# Patient Record
Sex: MALE | Race: BLACK OR AFRICAN AMERICAN | NOT HISPANIC OR LATINO | URBAN - METROPOLITAN AREA
[De-identification: names, ages, dates, MRNs, and addresses within clinical notes are randomized per-mention and may not be internally consistent; named-entity substitution may affect disease eponyms.]

---

## 2017-12-05 ENCOUNTER — INPATIENT (INPATIENT)
Facility: HOSPITAL | Age: 32
LOS: 22 days | Discharge: HOME | End: 2017-12-28
Attending: PSYCHIATRY & NEUROLOGY | Admitting: PSYCHIATRY & NEUROLOGY
Payer: COMMERCIAL

## 2017-12-05 ENCOUNTER — EMERGENCY (EMERGENCY)
Facility: HOSPITAL | Age: 32
LOS: 0 days | Discharge: ANOTHER HOSPITAL | End: 2017-12-05
Attending: EMERGENCY MEDICINE
Payer: SELF-PAY

## 2017-12-05 VITALS
RESPIRATION RATE: 16 BRPM | SYSTOLIC BLOOD PRESSURE: 136 MMHG | DIASTOLIC BLOOD PRESSURE: 77 MMHG | HEART RATE: 94 BPM | TEMPERATURE: 99 F | OXYGEN SATURATION: 97 %

## 2017-12-05 VITALS
WEIGHT: 154.98 LBS | SYSTOLIC BLOOD PRESSURE: 125 MMHG | OXYGEN SATURATION: 97 % | HEART RATE: 86 BPM | TEMPERATURE: 96 F | HEIGHT: 69 IN | DIASTOLIC BLOOD PRESSURE: 73 MMHG | RESPIRATION RATE: 19 BRPM

## 2017-12-05 VITALS — WEIGHT: 149.03 LBS | TEMPERATURE: 98 F | HEIGHT: 69 IN

## 2017-12-05 DIAGNOSIS — F33.9 MAJOR DEPRESSIVE DISORDER, RECURRENT, UNSPECIFIED: ICD-10-CM

## 2017-12-05 DIAGNOSIS — F29 UNSPECIFIED PSYCHOSIS NOT DUE TO A SUBSTANCE OR KNOWN PHYSIOLOGICAL CONDITION: ICD-10-CM

## 2017-12-05 DIAGNOSIS — R69 ILLNESS, UNSPECIFIED: ICD-10-CM

## 2017-12-05 LAB
ALBUMIN SERPL ELPH-MCNC: 4.1 G/DL — SIGNIFICANT CHANGE UP (ref 3.3–5)
ALP SERPL-CCNC: 55 U/L — SIGNIFICANT CHANGE UP (ref 40–120)
ALT FLD-CCNC: 30 U/L — SIGNIFICANT CHANGE UP (ref 12–78)
AMPHET UR-MCNC: NEGATIVE — SIGNIFICANT CHANGE UP
ANION GAP SERPL CALC-SCNC: 10 MMOL/L — SIGNIFICANT CHANGE UP (ref 5–17)
APAP SERPL-MCNC: < 2 UG/ML (ref 10–30)
APTT BLD: 32.5 SEC — SIGNIFICANT CHANGE UP (ref 27.5–37.4)
AST SERPL-CCNC: 44 U/L — HIGH (ref 15–37)
BARBITURATES UR SCN-MCNC: NEGATIVE — SIGNIFICANT CHANGE UP
BASOPHILS # BLD AUTO: 0.1 K/UL — SIGNIFICANT CHANGE UP (ref 0–0.2)
BASOPHILS NFR BLD AUTO: 1.2 % — SIGNIFICANT CHANGE UP (ref 0–2)
BENZODIAZ UR-MCNC: NEGATIVE — SIGNIFICANT CHANGE UP
BILIRUB SERPL-MCNC: 0.9 MG/DL — SIGNIFICANT CHANGE UP (ref 0.2–1.2)
BUN SERPL-MCNC: 14 MG/DL — SIGNIFICANT CHANGE UP (ref 7–23)
CALCIUM SERPL-MCNC: 8.8 MG/DL — SIGNIFICANT CHANGE UP (ref 8.5–10.1)
CHLORIDE SERPL-SCNC: 101 MMOL/L — SIGNIFICANT CHANGE UP (ref 96–108)
CO2 SERPL-SCNC: 25 MMOL/L — SIGNIFICANT CHANGE UP (ref 22–31)
COCAINE METAB.OTHER UR-MCNC: NEGATIVE — SIGNIFICANT CHANGE UP
CREAT SERPL-MCNC: 1.27 MG/DL — SIGNIFICANT CHANGE UP (ref 0.5–1.3)
EOSINOPHIL # BLD AUTO: 0 K/UL — SIGNIFICANT CHANGE UP (ref 0–0.5)
EOSINOPHIL NFR BLD AUTO: 0.1 % — SIGNIFICANT CHANGE UP (ref 0–6)
ETHANOL SERPL-MCNC: <10 MG/DL — SIGNIFICANT CHANGE UP (ref 0–10)
GLUCOSE BLDC GLUCOMTR-MCNC: 95 MG/DL — SIGNIFICANT CHANGE UP (ref 70–99)
GLUCOSE SERPL-MCNC: 90 MG/DL — SIGNIFICANT CHANGE UP (ref 70–99)
HCT VFR BLD CALC: 50.5 % — HIGH (ref 39–50)
HGB BLD-MCNC: 17.2 G/DL — HIGH (ref 13–17)
INR BLD: 1.21 RATIO — HIGH (ref 0.88–1.16)
LACTATE SERPL-SCNC: 1 MMOL/L — SIGNIFICANT CHANGE UP (ref 0.7–2)
LYMPHOCYTES # BLD AUTO: 0.9 K/UL — LOW (ref 1–3.3)
LYMPHOCYTES # BLD AUTO: 17.9 % — SIGNIFICANT CHANGE UP (ref 13–44)
MCHC RBC-ENTMCNC: 31.8 PG — SIGNIFICANT CHANGE UP (ref 27–34)
MCHC RBC-ENTMCNC: 34 GM/DL — SIGNIFICANT CHANGE UP (ref 32–36)
MCV RBC AUTO: 93.6 FL — SIGNIFICANT CHANGE UP (ref 80–100)
METHADONE UR-MCNC: NEGATIVE — SIGNIFICANT CHANGE UP
MONOCYTES # BLD AUTO: 0.3 K/UL — SIGNIFICANT CHANGE UP (ref 0–0.9)
MONOCYTES NFR BLD AUTO: 6.8 % — SIGNIFICANT CHANGE UP (ref 2–14)
NEUTROPHILS # BLD AUTO: 3.7 K/UL — SIGNIFICANT CHANGE UP (ref 1.8–7.4)
NEUTROPHILS NFR BLD AUTO: 74 % — SIGNIFICANT CHANGE UP (ref 43–77)
OPIATES UR-MCNC: NEGATIVE — SIGNIFICANT CHANGE UP
PCP SPEC-MCNC: SIGNIFICANT CHANGE UP
PCP UR-MCNC: NEGATIVE — SIGNIFICANT CHANGE UP
PLATELET # BLD AUTO: 164 K/UL — SIGNIFICANT CHANGE UP (ref 150–400)
POTASSIUM SERPL-MCNC: 4.1 MMOL/L — SIGNIFICANT CHANGE UP (ref 3.5–5.3)
POTASSIUM SERPL-SCNC: 4.1 MMOL/L — SIGNIFICANT CHANGE UP (ref 3.5–5.3)
PROT SERPL-MCNC: 8 GM/DL — SIGNIFICANT CHANGE UP (ref 6–8.3)
PROTHROM AB SERPL-ACNC: 13.2 SEC — HIGH (ref 9.8–12.7)
RBC # BLD: 5.4 M/UL — SIGNIFICANT CHANGE UP (ref 4.2–5.8)
RBC # FLD: 10.7 % — LOW (ref 11–15)
SALICYLATES SERPL-MCNC: <1.7 MG/DL — LOW (ref 2.8–20)
SODIUM SERPL-SCNC: 136 MMOL/L — SIGNIFICANT CHANGE UP (ref 135–145)
THC UR QL: NEGATIVE — SIGNIFICANT CHANGE UP
TSH SERPL-MCNC: 0.64 UU/ML — SIGNIFICANT CHANGE UP (ref 0.36–3.74)
WBC # BLD: 4.9 K/UL — SIGNIFICANT CHANGE UP (ref 3.8–10.5)
WBC # FLD AUTO: 4.9 K/UL — SIGNIFICANT CHANGE UP (ref 3.8–10.5)

## 2017-12-05 PROCEDURE — 99285 EMERGENCY DEPT VISIT HI MDM: CPT | Mod: 25

## 2017-12-05 PROCEDURE — 90792 PSYCH DIAG EVAL W/MED SRVCS: CPT

## 2017-12-05 PROCEDURE — 99222 1ST HOSP IP/OBS MODERATE 55: CPT

## 2017-12-05 PROCEDURE — 99053 MED SERV 10PM-8AM 24 HR FAC: CPT

## 2017-12-05 PROCEDURE — 70450 CT HEAD/BRAIN W/O DYE: CPT | Mod: 26

## 2017-12-05 RX ORDER — RISPERIDONE 4 MG/1
0.5 TABLET ORAL
Qty: 0 | Refills: 0 | Status: DISCONTINUED | OUTPATIENT
Start: 2017-12-05 | End: 2017-12-06

## 2017-12-05 RX ORDER — CLONAZEPAM 1 MG
0.5 TABLET ORAL
Qty: 0 | Refills: 0 | Status: DISCONTINUED | OUTPATIENT
Start: 2017-12-05 | End: 2017-12-06

## 2017-12-05 NOTE — ED BEHAVIORAL HEALTH ASSESSMENT NOTE - PAST PSYCHOTROPIC MEDICATION
Klonopin 0.5mg q daily, Ambien 5mgq hs, Propranolol, Remeron.   Patient would not take Sertraline, Ambien, Propranolol or Remeron. Psychiatrist is unsure if he has been compliant with Klonopin or other above medications. States that he struggled with concentration, and so was started on Adderall 5mg bid po on 11/1/2017- unsure about compliance

## 2017-12-05 NOTE — ED BEHAVIORAL HEALTH ASSESSMENT NOTE - HPI (INCLUDE ILLNESS QUALITY, SEVERITY, DURATION, TIMING, CONTEXT, MODIFYING FACTORS, ASSOCIATED SIGNS AND SYMPTOMS)
Briefly, the patient is a 32 year old male, single, quit his job one week ago, moved from [place] 9 months ago, now lives with mother, has no pertinent medical issues, has a chronic psychiatric history notable for chronic suspiciousness, anxiety- R/O Schizotypal personality disorder, no history of inpatient psychiatric admissions, no history of legal issues, no history of substance abuse as per mother, no history of SI or HI, presented to the ed this morning accompanied by ems after he was found wandering the streets, dressed inappropriately for the weather. In the ED he has been essentially calm. Labs and head ct has been wnl.   Met with the patient. He is noted to be guarded, paranoid, and suspicious. He believes that there are cameras in the room, and 'everyone will know everything'. Paranoia seems to be exclusively regarding my questions regarding substance abuse. Other questions he answers even though reluctantly. He endorses worsening symptoms of depression- hopeless, ' I feel like I am dead', worthless, poor appetite, isolative behaviors, and no sleep in the last few days as per patient. He is tearful during the interview. He states that the reason why he was on the streets was because ' the noises did not let me sleep, and they kept bothering me'. He denies any current ah. No VH. He is vague when asked about SI, and states- ' No, but I don't know how I can live like this. Help me'.   Called the patient's psychiatric provider, who states that she has only seen the patient x 3 for evaluation, and seemed to be stable in terms of his mood. He had denied any AH to her, and had denied any substance abuse. She states that she had initially and during subsequent  2 visits started him on Klonopin 0.5mg q daily, Ambien 5mgq hs, Propranolol, Remeron. Patient would not take Sertraline, Ambien, Propranolol or Remeron. She is unsure if he has been compliant with Klonopin. States that he struggled with concentration, and so was started on Adderall 5mg bid po on 11/1/2017.   Called the patient's mother who states that she was concerned because the patient left the house without a phone, and without telling her. She states that he has chronically been suspicious and generally an anxious person, but was able to finish his bachelors, and only recently- 1 week ago quit his job. He moved from [place] 9 months ago, and now lives with his mother. She states that in the last2-4 weeks he has been increasingly anxious, depressed, tearful, and now with worsening paranoia- believing that someone was after him'. She states that at one point last week, he ran out on the streets because he was suspecting that someone was watching him. He has not been sleeping. Mother is concerned for his safety.

## 2017-12-05 NOTE — ED ADULT NURSE REASSESSMENT NOTE - NS ED NURSE REASSESS COMMENT FT1
assumed care of pt in bed 11. Pt is alert and oriented x4. pt states he came to the hospital today by ambulance because he was hungry. pt denies any pain at this time. Pt in bed with two side rails up and on monitor.

## 2017-12-05 NOTE — ED BEHAVIORAL HEALTH ASSESSMENT NOTE - OTHER PAST PSYCHIATRIC HISTORY (INCLUDE DETAILS REGARDING ONSET, COURSE OF ILLNESS, INPATIENT/OUTPATIENT TREATMENT)
has a chronic psychiatric history notable for chronic suspiciousness, anxiety- R/O Schizotypal personality disorder, possible 2 inpatient psychiatric admissions in [place], no history of legal issues, no history of substance abuse as per mother, no history of SI or HI. Patient has been in treatment with Andreea MONDRAGON since 11/1/2017.

## 2017-12-05 NOTE — ED BEHAVIORAL HEALTH ASSESSMENT NOTE - DESCRIPTION
see hpi the patient is a 32 year old male, single, quit his job one week ago, moved from [place] 9 months ago, now lives with mother Calm

## 2017-12-05 NOTE — ED BEHAVIORAL HEALTH ASSESSMENT NOTE - OTHER
chronic mental illness?, ineffective medication management outpatient psychiatric provider did not assess

## 2017-12-05 NOTE — ED PROVIDER NOTE - PROGRESS NOTE DETAILS
Pt's Mom Maura [phone] is called and left message. pt's reported another # [phone] of his mom is called and left message. Dr. Mosley psychiatrist is here eval pt now. Dr. Mosley psychiatrist [facility] pt will be admitted to psych for schizophrenia. Dr. Santana accepts pt to [place] [id number]

## 2017-12-05 NOTE — ED BEHAVIORAL HEALTH ASSESSMENT NOTE - DIFFERENTIAL
At this point suspecting psychotic disorder/Schizophrenia vs MDD with psychosis vs substance induced psychosis (adderall? other drugs?, Schizotypal personality

## 2017-12-05 NOTE — ED PROVIDER NOTE - CONSTITUTIONAL, MLM
normal... Well appearing, well nourished, awake, alert, oriented to person, place, time/situation and in no apparent distress. Speaking in clear full sentences no nasal flaring no shoulders retractions, appears very comfortable lying on this back in a stretcher

## 2017-12-05 NOTE — ED BEHAVIORAL HEALTH ASSESSMENT NOTE - SUMMARY
Briefly, the patient is a 32 year old male, single, quit his job one week ago, moved from [place] 9 months ago, now lives with mother, has no pertinent medical issues, has a chronic psychiatric history notable for chronic suspiciousness, anxiety- R/O Schizotypal personality disorder, no history of inpatient psychiatric admissions, no history of legal issues, no history of substance abuse as per mother, no history of SI or HI, presented to the ed this morning accompanied by ems after he was found wandering the streets, dressed inappropriately for the weather. In the ED he has been essentially calm. Labs and head ct has been wnl.  Based on assessment done, and extensive collateral information obtained from mother+ psychiatric provider, patient has a chronic history of suspiciousness and anxiety, and in the last one month or so, has been exhibiting worsening symptoms of depression, increased anxiety, increased distress, isolative, with poor sleep, and worsening paranoia. He is notably paranoid, suspicious and guarded during my interview and vague when asked about SI. Mother expresses concerns for his safety kalli considering his worsening impulsivity, and worsening symptoms+ distress.   Discussed an inpatient psychiatric admission, which patient agreed, but he would not sign voluntary paperwork. 2pc signed instead. Patient does meet criteria for an inpatient psychiatric admission for symptom stabilization, diagnosis clarification, and ensuring safety. Mother aware. Psychiatric provider aware- and states that upon discharge from inpatient setting she will not take patient back under her care.  At this point suspecting psychotic disorder/Schizophrenia vs MDD with psychosis vs substance induced psychosis (adderall? other drugs?

## 2017-12-05 NOTE — ED ADULT TRIAGE NOTE - CHIEF COMPLAINT QUOTE
Patient BIBA: Shivering: patient wandering streets, not dressed for weather. Patient reports: "I was walking because I couldn't sleep. The voices kept me up." Patient reports "I ran out of my Klonopin." Patient denies suicidal or homicidal ideations. Patient states "I walk to quiet the voices so I can sleep."

## 2017-12-05 NOTE — ED PROVIDER NOTE - OBJECTIVE STATEMENT
32 years old by here found wondering in the street early this morning. Pt is alert and oriented x 3 sts he could not sleep and the hearing voices which kept him awake. pt sts he has a hx of psych seen a psychiatrist Dr. Pacheco in [place] last seen was-  one week ago. Pt also sts he ran out of Klonopin. Pt denies headache, dizziness, trauma, neck/back pain, blurred visions, light sensitivities, focal/distal weakness or numbness, cough, sob, chest pain, nausea, vomiting, fever, chills, abd pain, dysuria, harmful thoughts to himself or others. Pt sts he lives with mom Maura - [phone]. Pt sts he has hx of anxiety and hearing voices for one year.

## 2017-12-06 DIAGNOSIS — F29 UNSPECIFIED PSYCHOSIS NOT DUE TO A SUBSTANCE OR KNOWN PHYSIOLOGICAL CONDITION: ICD-10-CM

## 2017-12-06 DIAGNOSIS — Z79.899 OTHER LONG TERM (CURRENT) DRUG THERAPY: ICD-10-CM

## 2017-12-06 DIAGNOSIS — F20.9 SCHIZOPHRENIA, UNSPECIFIED: ICD-10-CM

## 2017-12-06 DIAGNOSIS — G47.00 INSOMNIA, UNSPECIFIED: ICD-10-CM

## 2017-12-06 DIAGNOSIS — R44.3 HALLUCINATIONS, UNSPECIFIED: ICD-10-CM

## 2017-12-06 DIAGNOSIS — Z73.9 PROBLEM RELATED TO LIFE MANAGEMENT DIFFICULTY, UNSPECIFIED: ICD-10-CM

## 2017-12-06 DIAGNOSIS — F41.9 ANXIETY DISORDER, UNSPECIFIED: ICD-10-CM

## 2017-12-06 PROCEDURE — 99233 SBSQ HOSP IP/OBS HIGH 50: CPT | Mod: 25

## 2017-12-06 PROCEDURE — 90853 GROUP PSYCHOTHERAPY: CPT

## 2017-12-06 RX ORDER — ARIPIPRAZOLE 15 MG/1
2 TABLET ORAL ONCE
Qty: 0 | Refills: 0 | Status: COMPLETED | OUTPATIENT
Start: 2017-12-06 | End: 2017-12-06

## 2017-12-06 RX ORDER — GABAPENTIN 400 MG/1
300 CAPSULE ORAL AT BEDTIME
Qty: 0 | Refills: 0 | Status: DISCONTINUED | OUTPATIENT
Start: 2017-12-06 | End: 2017-12-13

## 2017-12-06 RX ORDER — CLONAZEPAM 1 MG
0.5 TABLET ORAL DAILY
Qty: 0 | Refills: 0 | Status: DISCONTINUED | OUTPATIENT
Start: 2017-12-06 | End: 2017-12-13

## 2017-12-06 RX ORDER — ARIPIPRAZOLE 15 MG/1
5 TABLET ORAL DAILY
Qty: 0 | Refills: 0 | Status: DISCONTINUED | OUTPATIENT
Start: 2017-12-07 | End: 2017-12-08

## 2017-12-06 RX ADMIN — ARIPIPRAZOLE 2 MILLIGRAM(S): 15 TABLET ORAL at 20:56

## 2017-12-07 PROCEDURE — 90853 GROUP PSYCHOTHERAPY: CPT

## 2017-12-07 PROCEDURE — 99232 SBSQ HOSP IP/OBS MODERATE 35: CPT | Mod: 25

## 2017-12-07 RX ORDER — CLONAZEPAM 1 MG
0.5 TABLET ORAL
Qty: 0 | Refills: 0 | Status: DISCONTINUED | OUTPATIENT
Start: 2017-12-07 | End: 2017-12-09

## 2017-12-07 RX ADMIN — ARIPIPRAZOLE 5 MILLIGRAM(S): 15 TABLET ORAL at 14:43

## 2017-12-08 PROCEDURE — 99232 SBSQ HOSP IP/OBS MODERATE 35: CPT

## 2017-12-08 RX ORDER — ARIPIPRAZOLE 15 MG/1
10 TABLET ORAL AT BEDTIME
Qty: 0 | Refills: 0 | Status: DISCONTINUED | OUTPATIENT
Start: 2017-12-09 | End: 2017-12-09

## 2017-12-08 RX ORDER — ARIPIPRAZOLE 15 MG/1
5 TABLET ORAL AT BEDTIME
Qty: 0 | Refills: 0 | Status: COMPLETED | OUTPATIENT
Start: 2017-12-08 | End: 2017-12-08

## 2017-12-08 RX ADMIN — Medication 0.5 MILLIGRAM(S): at 21:04

## 2017-12-08 RX ADMIN — ARIPIPRAZOLE 5 MILLIGRAM(S): 15 TABLET ORAL at 21:04

## 2017-12-08 RX ADMIN — Medication 0.5 MILLIGRAM(S): at 09:58

## 2017-12-09 LAB
ALBUMIN SERPL ELPH-MCNC: 4.5 G/DL — SIGNIFICANT CHANGE UP (ref 3.3–5)
ALP SERPL-CCNC: 47 U/L — SIGNIFICANT CHANGE UP (ref 40–120)
ALT FLD-CCNC: 16 U/L — SIGNIFICANT CHANGE UP (ref 4–41)
AST SERPL-CCNC: 22 U/L — SIGNIFICANT CHANGE UP (ref 4–40)
BILIRUB DIRECT SERPL-MCNC: 0.1 MG/DL — SIGNIFICANT CHANGE UP (ref 0.1–0.2)
BILIRUB SERPL-MCNC: 0.6 MG/DL — SIGNIFICANT CHANGE UP (ref 0.2–1.2)
CHOLEST SERPL-MCNC: 173 MG/DL — SIGNIFICANT CHANGE UP (ref 120–199)
HBA1C BLD-MCNC: 5.5 % — SIGNIFICANT CHANGE UP (ref 4–5.6)
HDLC SERPL-MCNC: 69 MG/DL — HIGH (ref 35–55)
LIPID PNL WITH DIRECT LDL SERPL: 107 MG/DL — SIGNIFICANT CHANGE UP
PROT SERPL-MCNC: 7.6 G/DL — SIGNIFICANT CHANGE UP (ref 6–8.3)
TRIGL SERPL-MCNC: 59 MG/DL — SIGNIFICANT CHANGE UP (ref 10–149)

## 2017-12-09 PROCEDURE — 99232 SBSQ HOSP IP/OBS MODERATE 35: CPT

## 2017-12-09 RX ORDER — CLONAZEPAM 1 MG
0.5 TABLET ORAL AT BEDTIME
Qty: 0 | Refills: 0 | Status: DISCONTINUED | OUTPATIENT
Start: 2017-12-09 | End: 2017-12-12

## 2017-12-09 RX ORDER — CLONAZEPAM 1 MG
0.25 TABLET ORAL DAILY
Qty: 0 | Refills: 0 | Status: DISCONTINUED | OUTPATIENT
Start: 2017-12-09 | End: 2017-12-16

## 2017-12-09 RX ORDER — ARIPIPRAZOLE 15 MG/1
5 TABLET ORAL AT BEDTIME
Qty: 0 | Refills: 0 | Status: DISCONTINUED | OUTPATIENT
Start: 2017-12-09 | End: 2017-12-10

## 2017-12-09 RX ADMIN — ARIPIPRAZOLE 5 MILLIGRAM(S): 15 TABLET ORAL at 21:52

## 2017-12-09 RX ADMIN — Medication 0.5 MILLIGRAM(S): at 09:58

## 2017-12-09 RX ADMIN — Medication 0.5 MILLIGRAM(S): at 17:44

## 2017-12-10 PROCEDURE — 99232 SBSQ HOSP IP/OBS MODERATE 35: CPT

## 2017-12-10 RX ORDER — ARIPIPRAZOLE 15 MG/1
7 TABLET ORAL AT BEDTIME
Qty: 0 | Refills: 0 | Status: DISCONTINUED | OUTPATIENT
Start: 2017-12-10 | End: 2017-12-14

## 2017-12-10 RX ADMIN — ARIPIPRAZOLE 7 MILLIGRAM(S): 15 TABLET ORAL at 20:32

## 2017-12-10 RX ADMIN — Medication 0.5 MILLIGRAM(S): at 09:19

## 2017-12-10 RX ADMIN — Medication 0.5 MILLIGRAM(S): at 20:32

## 2017-12-11 PROCEDURE — 99232 SBSQ HOSP IP/OBS MODERATE 35: CPT | Mod: 25

## 2017-12-11 PROCEDURE — 90853 GROUP PSYCHOTHERAPY: CPT

## 2017-12-11 RX ADMIN — Medication 0.25 MILLIGRAM(S): at 08:55

## 2017-12-11 RX ADMIN — Medication 0.5 MILLIGRAM(S): at 15:52

## 2017-12-11 RX ADMIN — ARIPIPRAZOLE 7 MILLIGRAM(S): 15 TABLET ORAL at 21:56

## 2017-12-12 PROCEDURE — 90853 GROUP PSYCHOTHERAPY: CPT

## 2017-12-12 PROCEDURE — 99232 SBSQ HOSP IP/OBS MODERATE 35: CPT | Mod: 25

## 2017-12-12 RX ORDER — CLONAZEPAM 1 MG
0.5 TABLET ORAL DAILY
Qty: 0 | Refills: 0 | Status: DISCONTINUED | OUTPATIENT
Start: 2017-12-14 | End: 2017-12-17

## 2017-12-12 RX ORDER — CLONAZEPAM 1 MG
0.25 TABLET ORAL AT BEDTIME
Qty: 0 | Refills: 0 | Status: DISCONTINUED | OUTPATIENT
Start: 2017-12-12 | End: 2017-12-19

## 2017-12-12 RX ADMIN — Medication 0.25 MILLIGRAM(S): at 09:55

## 2017-12-12 RX ADMIN — Medication 0.5 MILLIGRAM(S): at 18:17

## 2017-12-12 RX ADMIN — ARIPIPRAZOLE 7 MILLIGRAM(S): 15 TABLET ORAL at 20:44

## 2017-12-13 PROCEDURE — 99232 SBSQ HOSP IP/OBS MODERATE 35: CPT

## 2017-12-13 RX ORDER — ACETAMINOPHEN 500 MG
650 TABLET ORAL EVERY 6 HOURS
Qty: 0 | Refills: 0 | Status: DISCONTINUED | OUTPATIENT
Start: 2017-12-13 | End: 2017-12-28

## 2017-12-13 RX ADMIN — Medication 0.25 MILLIGRAM(S): at 21:19

## 2017-12-13 RX ADMIN — Medication 0.5 MILLIGRAM(S): at 18:47

## 2017-12-13 RX ADMIN — Medication 650 MILLIGRAM(S): at 22:50

## 2017-12-13 RX ADMIN — Medication 0.25 MILLIGRAM(S): at 08:48

## 2017-12-13 RX ADMIN — ARIPIPRAZOLE 7 MILLIGRAM(S): 15 TABLET ORAL at 21:19

## 2017-12-14 PROCEDURE — 99232 SBSQ HOSP IP/OBS MODERATE 35: CPT

## 2017-12-14 RX ORDER — RISPERIDONE 4 MG/1
2 TABLET ORAL EVERY 4 HOURS
Qty: 0 | Refills: 0 | Status: DISCONTINUED | OUTPATIENT
Start: 2017-12-14 | End: 2017-12-28

## 2017-12-14 RX ORDER — DIPHENHYDRAMINE HCL 50 MG
50 CAPSULE ORAL ONCE
Qty: 0 | Refills: 0 | Status: DISCONTINUED | OUTPATIENT
Start: 2017-12-14 | End: 2017-12-28

## 2017-12-14 RX ORDER — HALOPERIDOL DECANOATE 100 MG/ML
5 INJECTION INTRAMUSCULAR EVERY 6 HOURS
Qty: 0 | Refills: 0 | Status: DISCONTINUED | OUTPATIENT
Start: 2017-12-14 | End: 2017-12-28

## 2017-12-14 RX ORDER — CLONAZEPAM 1 MG
0.25 TABLET ORAL DAILY
Qty: 0 | Refills: 0 | Status: DISCONTINUED | OUTPATIENT
Start: 2017-12-17 | End: 2017-12-18

## 2017-12-14 RX ORDER — ARIPIPRAZOLE 15 MG/1
10 TABLET ORAL AT BEDTIME
Qty: 0 | Refills: 0 | Status: DISCONTINUED | OUTPATIENT
Start: 2017-12-14 | End: 2017-12-19

## 2017-12-14 RX ADMIN — Medication 0.25 MILLIGRAM(S): at 21:54

## 2017-12-14 RX ADMIN — Medication 0.5 MILLIGRAM(S): at 15:31

## 2017-12-14 RX ADMIN — Medication 0.25 MILLIGRAM(S): at 08:30

## 2017-12-14 RX ADMIN — ARIPIPRAZOLE 10 MILLIGRAM(S): 15 TABLET ORAL at 21:54

## 2017-12-15 PROCEDURE — 99232 SBSQ HOSP IP/OBS MODERATE 35: CPT | Mod: 25

## 2017-12-15 PROCEDURE — 90853 GROUP PSYCHOTHERAPY: CPT

## 2017-12-15 RX ORDER — IBUPROFEN 200 MG
600 TABLET ORAL ONCE
Qty: 0 | Refills: 0 | Status: DISCONTINUED | OUTPATIENT
Start: 2017-12-15 | End: 2017-12-28

## 2017-12-15 RX ADMIN — ARIPIPRAZOLE 10 MILLIGRAM(S): 15 TABLET ORAL at 20:45

## 2017-12-15 RX ADMIN — Medication 650 MILLIGRAM(S): at 13:12

## 2017-12-15 RX ADMIN — Medication 0.25 MILLIGRAM(S): at 23:20

## 2017-12-15 RX ADMIN — Medication 0.5 MILLIGRAM(S): at 20:45

## 2017-12-15 RX ADMIN — Medication 0.25 MILLIGRAM(S): at 09:24

## 2017-12-15 RX ADMIN — Medication 650 MILLIGRAM(S): at 14:18

## 2017-12-16 RX ADMIN — Medication 650 MILLIGRAM(S): at 23:41

## 2017-12-16 RX ADMIN — Medication 650 MILLIGRAM(S): at 22:40

## 2017-12-16 RX ADMIN — Medication 0.25 MILLIGRAM(S): at 09:16

## 2017-12-16 RX ADMIN — ARIPIPRAZOLE 10 MILLIGRAM(S): 15 TABLET ORAL at 20:51

## 2017-12-16 RX ADMIN — Medication 0.25 MILLIGRAM(S): at 20:51

## 2017-12-17 RX ADMIN — ARIPIPRAZOLE 10 MILLIGRAM(S): 15 TABLET ORAL at 20:25

## 2017-12-17 RX ADMIN — Medication 0.5 MILLIGRAM(S): at 16:14

## 2017-12-17 RX ADMIN — Medication 0.25 MILLIGRAM(S): at 10:06

## 2017-12-18 PROCEDURE — 90853 GROUP PSYCHOTHERAPY: CPT

## 2017-12-18 PROCEDURE — 99232 SBSQ HOSP IP/OBS MODERATE 35: CPT | Mod: 25

## 2017-12-18 RX ORDER — CLONAZEPAM 1 MG
0.25 TABLET ORAL
Qty: 0 | Refills: 0 | Status: DISCONTINUED | OUTPATIENT
Start: 2017-12-18 | End: 2017-12-25

## 2017-12-18 RX ORDER — LANOLIN ALCOHOL/MO/W.PET/CERES
3 CREAM (GRAM) TOPICAL AT BEDTIME
Qty: 0 | Refills: 0 | Status: DISCONTINUED | OUTPATIENT
Start: 2017-12-18 | End: 2017-12-28

## 2017-12-18 RX ADMIN — Medication 0.25 MILLIGRAM(S): at 17:20

## 2017-12-18 RX ADMIN — Medication 0.25 MILLIGRAM(S): at 20:45

## 2017-12-18 RX ADMIN — ARIPIPRAZOLE 10 MILLIGRAM(S): 15 TABLET ORAL at 20:45

## 2017-12-18 RX ADMIN — Medication 0.25 MILLIGRAM(S): at 08:55

## 2017-12-19 PROCEDURE — 99232 SBSQ HOSP IP/OBS MODERATE 35: CPT | Mod: 25

## 2017-12-19 PROCEDURE — 90853 GROUP PSYCHOTHERAPY: CPT

## 2017-12-19 RX ORDER — CLONAZEPAM 1 MG
0.5 TABLET ORAL DAILY
Qty: 0 | Refills: 0 | Status: DISCONTINUED | OUTPATIENT
Start: 2017-12-22 | End: 2017-12-28

## 2017-12-19 RX ORDER — ARIPIPRAZOLE 15 MG/1
15 TABLET ORAL AT BEDTIME
Qty: 0 | Refills: 0 | Status: DISCONTINUED | OUTPATIENT
Start: 2017-12-19 | End: 2017-12-21

## 2017-12-19 RX ADMIN — Medication 0.25 MILLIGRAM(S): at 21:02

## 2017-12-19 RX ADMIN — Medication 0.25 MILLIGRAM(S): at 18:03

## 2017-12-19 RX ADMIN — ARIPIPRAZOLE 15 MILLIGRAM(S): 15 TABLET ORAL at 21:02

## 2017-12-20 PROCEDURE — 90853 GROUP PSYCHOTHERAPY: CPT

## 2017-12-20 PROCEDURE — 99232 SBSQ HOSP IP/OBS MODERATE 35: CPT | Mod: 25

## 2017-12-20 RX ADMIN — ARIPIPRAZOLE 15 MILLIGRAM(S): 15 TABLET ORAL at 21:23

## 2017-12-20 RX ADMIN — Medication 650 MILLIGRAM(S): at 23:27

## 2017-12-20 RX ADMIN — Medication 0.25 MILLIGRAM(S): at 16:36

## 2017-12-21 PROCEDURE — 90853 GROUP PSYCHOTHERAPY: CPT

## 2017-12-21 PROCEDURE — 99232 SBSQ HOSP IP/OBS MODERATE 35: CPT | Mod: 25

## 2017-12-21 RX ORDER — LANOLIN ALCOHOL/MO/W.PET/CERES
1 CREAM (GRAM) TOPICAL ONCE
Qty: 0 | Refills: 0 | Status: DISCONTINUED | OUTPATIENT
Start: 2017-12-21 | End: 2017-12-21

## 2017-12-21 RX ORDER — LANOLIN ALCOHOL/MO/W.PET/CERES
1.5 CREAM (GRAM) TOPICAL ONCE
Qty: 0 | Refills: 0 | Status: COMPLETED | OUTPATIENT
Start: 2017-12-21 | End: 2017-12-21

## 2017-12-21 RX ORDER — ARIPIPRAZOLE 15 MG/1
20 TABLET ORAL AT BEDTIME
Qty: 0 | Refills: 0 | Status: DISCONTINUED | OUTPATIENT
Start: 2017-12-21 | End: 2017-12-28

## 2017-12-21 RX ADMIN — Medication 1.5 MILLIGRAM(S): at 21:52

## 2017-12-21 RX ADMIN — Medication 0.25 MILLIGRAM(S): at 18:13

## 2017-12-21 RX ADMIN — ARIPIPRAZOLE 20 MILLIGRAM(S): 15 TABLET ORAL at 21:02

## 2017-12-21 RX ADMIN — Medication 3 MILLIGRAM(S): at 01:52

## 2017-12-22 PROCEDURE — 99232 SBSQ HOSP IP/OBS MODERATE 35: CPT

## 2017-12-22 PROCEDURE — 90853 GROUP PSYCHOTHERAPY: CPT

## 2017-12-22 RX ORDER — CLONAZEPAM 1 MG
0.25 TABLET ORAL
Qty: 0 | Refills: 0 | Status: DISCONTINUED | OUTPATIENT
Start: 2017-12-26 | End: 2017-12-26

## 2017-12-22 RX ADMIN — Medication 0.5 MILLIGRAM(S): at 15:40

## 2017-12-22 RX ADMIN — Medication 3 MILLIGRAM(S): at 22:27

## 2017-12-22 RX ADMIN — ARIPIPRAZOLE 20 MILLIGRAM(S): 15 TABLET ORAL at 21:30

## 2017-12-23 RX ADMIN — Medication 0.25 MILLIGRAM(S): at 17:28

## 2017-12-23 RX ADMIN — ARIPIPRAZOLE 20 MILLIGRAM(S): 15 TABLET ORAL at 20:38

## 2017-12-24 RX ADMIN — ARIPIPRAZOLE 20 MILLIGRAM(S): 15 TABLET ORAL at 21:14

## 2017-12-24 RX ADMIN — Medication 1 TABLET(S): at 11:24

## 2017-12-24 RX ADMIN — Medication 0.25 MILLIGRAM(S): at 17:51

## 2017-12-25 RX ADMIN — Medication 3 MILLIGRAM(S): at 23:10

## 2017-12-25 RX ADMIN — ARIPIPRAZOLE 20 MILLIGRAM(S): 15 TABLET ORAL at 20:49

## 2017-12-25 RX ADMIN — Medication 1 TABLET(S): at 09:59

## 2017-12-25 RX ADMIN — Medication 0.25 MILLIGRAM(S): at 17:00

## 2017-12-26 PROCEDURE — 99232 SBSQ HOSP IP/OBS MODERATE 35: CPT

## 2017-12-26 RX ORDER — CLONAZEPAM 1 MG
0 TABLET ORAL
Qty: 0 | Refills: 0 | COMMUNITY

## 2017-12-26 RX ORDER — ARIPIPRAZOLE 15 MG/1
1 TABLET ORAL
Qty: 14 | Refills: 0 | OUTPATIENT
Start: 2017-12-26 | End: 2018-01-08

## 2017-12-26 RX ORDER — CLONAZEPAM 1 MG
1 TABLET ORAL
Qty: 14 | Refills: 0 | OUTPATIENT
Start: 2017-12-26 | End: 2018-01-08

## 2017-12-26 RX ORDER — LANOLIN ALCOHOL/MO/W.PET/CERES
1 CREAM (GRAM) TOPICAL
Qty: 14 | Refills: 0 | OUTPATIENT
Start: 2017-12-26 | End: 2018-01-08

## 2017-12-26 RX ORDER — CLONAZEPAM 1 MG
0.25 TABLET ORAL
Qty: 0 | Refills: 0 | Status: DISCONTINUED | OUTPATIENT
Start: 2017-12-26 | End: 2017-12-27

## 2017-12-26 RX ADMIN — Medication 0.25 MILLIGRAM(S): at 16:42

## 2017-12-26 RX ADMIN — ARIPIPRAZOLE 20 MILLIGRAM(S): 15 TABLET ORAL at 20:52

## 2017-12-26 RX ADMIN — Medication 1 TABLET(S): at 09:05

## 2017-12-27 VITALS — TEMPERATURE: 99 F | RESPIRATION RATE: 18 BRPM

## 2017-12-27 PROCEDURE — 90853 GROUP PSYCHOTHERAPY: CPT

## 2017-12-27 PROCEDURE — 99232 SBSQ HOSP IP/OBS MODERATE 35: CPT | Mod: 25

## 2017-12-27 RX ORDER — CLONAZEPAM 1 MG
1 TABLET ORAL
Qty: 28 | Refills: 0 | OUTPATIENT
Start: 2017-12-27 | End: 2018-01-09

## 2017-12-27 RX ORDER — CLONAZEPAM 1 MG
1 TABLET ORAL
Qty: 14 | Refills: 0 | OUTPATIENT
Start: 2017-12-27 | End: 2018-01-09

## 2017-12-27 RX ORDER — CLONAZEPAM 1 MG
0.25 TABLET ORAL
Qty: 0 | Refills: 0 | Status: DISCONTINUED | OUTPATIENT
Start: 2017-12-27 | End: 2017-12-28

## 2017-12-27 RX ORDER — ARIPIPRAZOLE 15 MG/1
400 TABLET ORAL ONCE
Qty: 0 | Refills: 0 | Status: COMPLETED | OUTPATIENT
Start: 2017-12-27 | End: 2017-12-27

## 2017-12-27 RX ADMIN — Medication 1 TABLET(S): at 08:41

## 2017-12-27 RX ADMIN — Medication 0.25 MILLIGRAM(S): at 13:03

## 2017-12-27 RX ADMIN — Medication 0.25 MILLIGRAM(S): at 17:13

## 2017-12-27 RX ADMIN — ARIPIPRAZOLE 20 MILLIGRAM(S): 15 TABLET ORAL at 20:56

## 2017-12-27 RX ADMIN — ARIPIPRAZOLE 400 MILLIGRAM(S): 15 TABLET ORAL at 12:17

## 2017-12-28 PROCEDURE — 99239 HOSP IP/OBS DSCHRG MGMT >30: CPT | Mod: 25

## 2017-12-28 PROCEDURE — 90853 GROUP PSYCHOTHERAPY: CPT

## 2017-12-28 RX ADMIN — Medication 1 TABLET(S): at 09:22

## 2018-01-03 ENCOUNTER — OUTPATIENT (OUTPATIENT)
Dept: OUTPATIENT SERVICES | Facility: HOSPITAL | Age: 33
LOS: 1 days | Discharge: HOME | End: 2018-01-03
Payer: MEDICAID

## 2018-01-05 DIAGNOSIS — F20.0 PARANOID SCHIZOPHRENIA: ICD-10-CM

## 2018-01-31 PROCEDURE — 99213 OFFICE O/P EST LOW 20 MIN: CPT

## 2018-05-23 PROCEDURE — 99213 OFFICE O/P EST LOW 20 MIN: CPT

## 2018-06-27 PROCEDURE — 99213 OFFICE O/P EST LOW 20 MIN: CPT
